# Patient Record
Sex: FEMALE | Race: WHITE | NOT HISPANIC OR LATINO | Employment: UNEMPLOYED | URBAN - METROPOLITAN AREA
[De-identification: names, ages, dates, MRNs, and addresses within clinical notes are randomized per-mention and may not be internally consistent; named-entity substitution may affect disease eponyms.]

---

## 2023-03-03 ENCOUNTER — TELEPHONE (OUTPATIENT)
Dept: OTHER | Facility: CLINIC | Age: 58
End: 2023-03-03
Payer: COMMERCIAL

## 2023-03-03 NOTE — TELEPHONE ENCOUNTER
[facility]    Who is the name of the provider?:  Bridget requested    What is the location you see this provider at/preferred location?: [place]  Person calling / Facility: Adry Henao  Phone number:  [phone]  Nurse call back needed:  NO     Reason for call:   Lipidedema self referral Bridget requested    Pharmacy location:  n/a  Outside Imaging: Not Applicable   Can we leave a detailed message on this number?  YES

## 2023-03-03 NOTE — TELEPHONE ENCOUNTER
Patient can be scheduled for in person consult with Dr. Gill for lipedema    Appt note: Self referral for lipedema.     Lidia JOHN, MEIR    [facility]  Office: [phone]  Fax: [phone]

## 2023-03-09 NOTE — TELEPHONE ENCOUNTER
Future Appointments   Date Time Provider Department Center   3/29/2023 12:00 PM Aimee Gill MD [facility]

## 2023-03-29 ENCOUNTER — OFFICE VISIT (OUTPATIENT)
Dept: OTHER | Facility: CLINIC | Age: 58
End: 2023-03-29
Attending: INTERNAL MEDICINE
Payer: COMMERCIAL

## 2023-03-29 ENCOUNTER — LAB (OUTPATIENT)
Dept: LAB | Facility: CLINIC | Age: 58
End: 2023-03-29
Attending: INTERNAL MEDICINE
Payer: COMMERCIAL

## 2023-03-29 VITALS
WEIGHT: 139.6 LBS | BODY MASS INDEX: 29.3 KG/M2 | HEART RATE: 85 BPM | SYSTOLIC BLOOD PRESSURE: 129 MMHG | DIASTOLIC BLOOD PRESSURE: 85 MMHG | OXYGEN SATURATION: 100 % | HEIGHT: 58 IN

## 2023-03-29 DIAGNOSIS — I83.893 VARICOSE VEINS OF BILATERAL LOWER EXTREMITIES WITH OTHER COMPLICATIONS: Primary | ICD-10-CM

## 2023-03-29 DIAGNOSIS — E53.8 VITAMIN B12 DEFICIENCY (NON ANEMIC): ICD-10-CM

## 2023-03-29 DIAGNOSIS — R60.9 LIPEDEMA: ICD-10-CM

## 2023-03-29 DIAGNOSIS — R25.2 LEG CRAMPS: ICD-10-CM

## 2023-03-29 DIAGNOSIS — D64.9 ANEMIA, UNSPECIFIED TYPE: ICD-10-CM

## 2023-03-29 DIAGNOSIS — Z98.84 S/P GASTRIC BYPASS: ICD-10-CM

## 2023-03-29 LAB
ALBUMIN SERPL BCG-MCNC: 4.3 G/DL (ref 3.5–5.2)
ALP SERPL-CCNC: 148 U/L (ref 35–104)
ALT SERPL W P-5'-P-CCNC: 23 U/L (ref 10–35)
ANION GAP SERPL CALCULATED.3IONS-SCNC: 11 MMOL/L (ref 7–15)
AST SERPL W P-5'-P-CCNC: 26 U/L (ref 10–35)
BASOPHILS # BLD AUTO: 0 10E3/UL (ref 0–0.2)
BASOPHILS NFR BLD AUTO: 0 %
BILIRUB SERPL-MCNC: 0.7 MG/DL
BUN SERPL-MCNC: 15 MG/DL (ref 6–20)
CALCIUM SERPL-MCNC: 9.7 MG/DL (ref 8.6–10)
CHLORIDE SERPL-SCNC: 105 MMOL/L (ref 98–107)
CREAT SERPL-MCNC: 0.81 MG/DL (ref 0.51–0.95)
DEPRECATED HCO3 PLAS-SCNC: 28 MMOL/L (ref 22–29)
EOSINOPHIL # BLD AUTO: 0.1 10E3/UL (ref 0–0.7)
EOSINOPHIL NFR BLD AUTO: 2 %
ERYTHROCYTE [DISTWIDTH] IN BLOOD BY AUTOMATED COUNT: 14.1 % (ref 10–15)
FERRITIN SERPL-MCNC: 17 NG/ML (ref 11–328)
FOLATE SERPL-MCNC: 25.3 NG/ML (ref 4.6–34.8)
GFR SERPL CREATININE-BSD FRML MDRD: 85 ML/MIN/1.73M2
GLUCOSE SERPL-MCNC: 93 MG/DL (ref 70–99)
HCT VFR BLD AUTO: 41.6 % (ref 35–47)
HGB BLD-MCNC: 13.4 G/DL (ref 11.7–15.7)
IMM GRANULOCYTES # BLD: 0 10E3/UL
IMM GRANULOCYTES NFR BLD: 0 %
IRON BINDING CAPACITY (ROCHE): 441 UG/DL (ref 240–430)
IRON SATN MFR SERPL: 22 % (ref 15–46)
IRON SERPL-MCNC: 96 UG/DL (ref 37–145)
LYMPHOCYTES # BLD AUTO: 2 10E3/UL (ref 0.8–5.3)
LYMPHOCYTES NFR BLD AUTO: 37 %
MAGNESIUM SERPL-MCNC: 2.3 MG/DL (ref 1.7–2.3)
MCH RBC QN AUTO: 28.9 PG (ref 26.5–33)
MCHC RBC AUTO-ENTMCNC: 32.2 G/DL (ref 31.5–36.5)
MCV RBC AUTO: 90 FL (ref 78–100)
MONOCYTES # BLD AUTO: 0.3 10E3/UL (ref 0–1.3)
MONOCYTES NFR BLD AUTO: 6 %
NEUTROPHILS # BLD AUTO: 3 10E3/UL (ref 1.6–8.3)
NEUTROPHILS NFR BLD AUTO: 55 %
NRBC # BLD AUTO: 0 10E3/UL
NRBC BLD AUTO-RTO: 0 /100
PLATELET # BLD AUTO: 306 10E3/UL (ref 150–450)
POTASSIUM SERPL-SCNC: 4.8 MMOL/L (ref 3.4–5.3)
PROT SERPL-MCNC: 7.4 G/DL (ref 6.4–8.3)
RBC # BLD AUTO: 4.63 10E6/UL (ref 3.8–5.2)
SODIUM SERPL-SCNC: 144 MMOL/L (ref 136–145)
TSH SERPL DL<=0.005 MIU/L-ACNC: 2.29 UIU/ML (ref 0.3–4.2)
VIT B12 SERPL-MCNC: 620 PG/ML (ref 232–1245)
WBC # BLD AUTO: 5.4 10E3/UL (ref 4–11)

## 2023-03-29 PROCEDURE — 82728 ASSAY OF FERRITIN: CPT

## 2023-03-29 PROCEDURE — 99205 OFFICE O/P NEW HI 60 MIN: CPT | Performed by: INTERNAL MEDICINE

## 2023-03-29 PROCEDURE — 83735 ASSAY OF MAGNESIUM: CPT

## 2023-03-29 PROCEDURE — 83550 IRON BINDING TEST: CPT

## 2023-03-29 PROCEDURE — 85025 COMPLETE CBC W/AUTO DIFF WBC: CPT

## 2023-03-29 PROCEDURE — G0463 HOSPITAL OUTPT CLINIC VISIT: HCPCS

## 2023-03-29 PROCEDURE — 82607 VITAMIN B-12: CPT

## 2023-03-29 PROCEDURE — 84443 ASSAY THYROID STIM HORMONE: CPT

## 2023-03-29 PROCEDURE — 80053 COMPREHEN METABOLIC PANEL: CPT

## 2023-03-29 PROCEDURE — 99212 OFFICE O/P EST SF 10 MIN: CPT | Performed by: INTERNAL MEDICINE

## 2023-03-29 PROCEDURE — 82746 ASSAY OF FOLIC ACID SERUM: CPT

## 2023-03-29 PROCEDURE — 36415 COLL VENOUS BLD VENIPUNCTURE: CPT

## 2023-03-29 NOTE — PROGRESS NOTES
"[facility]        Patient is here for a consult to discuss   Self referral for Lipdemea    Pt is currently taking none.    /85 (BP Location: Left arm)   Pulse 85   Ht 4' 10.25\" (1.48 m)   Wt 139 lb 9.6 oz (63.3 kg)   SpO2 100%   BMI 28.93 kg/m      Refills are needed: No    Has homecare services and agency name:  Sultana Martinez RN      "

## 2023-03-29 NOTE — PATIENT INSTRUCTIONS
Courtesy from [email or web address]  Take vein/lymphatic formula 2 pills a day   See edema therapist   Go for  venous comp studies in [place]   Use compression , elevation , wraps etc   Virtual video visit in 2-3 weeks

## 2023-03-29 NOTE — PROGRESS NOTES
[facility] INITIAL VASCULAR MEDICINE CONSULT  ( New patient visit)       PRIMARY HEALTH CARE PROVIDER:  Dr. Miller ( [facility])     REFERRING HEALTH CARE PROVIDER;   Self Referred    REASON FOR CONSULT: Evaluation and management of possible lipedema with known history of bilateral lower extremity varicose veins and underwent multiple venous ablations outside the system        HPI: Adry Henao is a 57 year old very pleasant female lives in [facility] accompanied by her  today for evaluation and management of possible lipedema.  She was morbidly obese weight more than 200 pounds and she is short statured with height around 4 feet 10 inches underwent gastric bypass surgery in 2013 with successful weight loss.  She also has a bilateral lower extremity varicose veins underwent multiple venous ablations and last intervention was in 2019 before the COVID pandemic.  She has been experiencing bilateral leg heaviness fullness fatigue and tiredness.  She is a housewife.  She self researched and found her body habitus and legs looks lipedema and she joined [place] Facebook group and she was self-referred here for further evaluation and management.  She gives a history of attaining puberty around age 12 and her lower body specifically lower abdomen buttocks and thighs are disproportionately larger than rest of the body and she was such a beer than all her friends of her age group.  Her body habitus further worsened after pregnancy and childbirth.  She has 3 children oldest 1 age 39 and she had a baby when she was 18 years old.  Youngest child is 30 years old.  She attained menopause age 52.  No history of a DVT or PE    She also developed B12 deficiency and electrolyte abnormalities and she took for a while B12 pills and multivitamin.    No recent lab work done within the last year    She is new to this clinic reviewed available records in the [facility]  Everywhere and updated chart        PAST MEDICAL HISTORY  Past Medical History:   Diagnosis Date     Anemia      Lipedema      Obesity      Vitamin B12 deficiency (non anemic)        CURRENT MEDICATIONS  Reviewed and she currently not taking any medications    PAST SURGICAL HISTORY:  Past Surgical History:   Procedure Laterality Date     AS KNEE SCOPE,MED/LAT MENISCUS REPAIR      4+ years ago     CHOLECYSTECTOMY      decades ago     GASTRIC BYPASS  2013       ALLERGIES   No Known Allergies    FAMILY HISTORY  Reviewed non contributory   VASCULAR FAMILY HISTORY  1st order relative with atherosclerotic PAD: No  1st order relative with AAA: No  Family history of Familial Hyperlipidemia No  Family History of Hypercoagulable state:No    VASCULAR RISK FACTORS  1. Diabetes:No   2. Smoking: has never smoked.  3. HTN: normotensive  4.Hyperlipidemia:  Unknown       SOCIAL HISTORY  Social History     Socioeconomic History     Marital status:      Spouse name: Not on file     Number of children: Not on file     Years of education: Not on file     Highest education level: Not on file   Occupational History     Not on file   Tobacco Use     Smoking status: Never     Smokeless tobacco: Never   Substance and Sexual Activity     Alcohol use: Yes     Comment: rarely     Drug use: Never     Sexual activity: Not on file   Other Topics Concern     Not on file   Social History Narrative     Not on file     Social Determinants of Health     Financial Resource Strain: Not on file   Food Insecurity: Not on file   Transportation Needs: Not on file   Physical Activity: Not on file   Stress: Not on file   Social Connections: Not on file   Intimate Partner Violence: Not on file   Housing Stability: Not on file       ROS:   General: No change in weight, sleep or appetite.  Normal energy.  No fever or chills  Eyes: Negative for vision changes or eye problems  ENT: No problems with ears, nose or throat.  No difficulty swallowing.  Resp: No  "coughing, wheezing or shortness of breath  CV: No chest pains or palpitations  GI: No nausea, vomiting,  heartburn, abdominal pain, diarrhea, constipation or change in bowel habits  : No urinary frequency or dysuria, bladder or kidney problems  Musculoskeletal: No significant muscle or joint pains  Neurologic: No headaches, numbness, tingling, weakness, problems with balance or coordination  Psychiatric: No problems with anxiety, depression or mental health  Heme/immune/allergy: No history of bleeding or clotting problems or anemia.  No allergies or immune system problems  Endocrine: No history of thyroid disease, diabetes or other endocrine disorders  Skin: No rashes,worrisome lesions or skin problems  Vascular: History of bilateral lower extremity varicose veins underwent multiple venous ablations  Both legs feels heavy, tired, fatigue  Easy bruising  Lower body disproportionately larger than upper body since puberty  Lumpy bumpy lower abdomen thighs and buttock area      EXAM:  /85 (BP Location: Left arm)   Pulse 85   Ht 4' 10.25\" (1.48 m)   Wt 139 lb 9.6 oz (63.3 kg)   SpO2 100%   BMI 28.93 kg/m    In general, the patient is a pleasant female in no apparent distress.    HEENT: NC/AT.  PERRLA.  EOMI.  Sclerae white, not injected.  Nares clear.  Pharynx without erythema or exudate.  Dentition intact.    Neck: No adenopathy.  No thyromegaly. Carotids +2/2 bilaterally without bruits.  No jugular venous distension.   Heart: RRR. Normal S1, S2 splits physiologically. No murmur, rub, click, or gallop. The PMI is in the 5th ICS in the midclavicular line. There is no heave.    Lungs: CTA.  No ronchi, wheezes, rales.  No dullness to percussion.   Abdomen: Soft, nontender, nondistended. No organomegaly. No AAA.  No bruits.   Extremities: Vascular:  She has a bilateral lower extremity varicose veins CEAP 3 CVI  Classical features of lipedema with overhanging of the fat on the knee area, lower abdomen, buttocks " and thighs are disproportionately larger than upper body  Upper arms are  disproportionately larger than forearms  Leg swelling stops at the ankles  No clinical evidence of lymphedema  Palpable symmetrical peripheral pulses  No foot ulcers or leg ulcers      Labs:    Procedures:           Assessment and Plan:     1. Varicose veins of bilateral lower extremities with other complications, CEAP 3 CVI ( previous multiple ablations bilaterally last intervention 2019)     2. Lipedema    This is a very pleasant 57-year-old female with known history of bilateral lower extremity varicose veins with previous multiple interventions of ablation and classical features of lipedema and ongoing fatigue, tiredness of the legs, easy bruising and having difficulty in handling day-to-day activities.  Lower body disproportionately larger than upper body since puberty.  She has no clinical evidence of lymphedema  Overhanging of the fat on the knees and also disproportionate fat deposition in the buttocks and thighs area  Tender to touch and easy bruising etc.    We discussed about the lipedema and its long-term implication and unfortunately there is no cure for it but multimodality approach can minimize the further progression and improve the quality of life etc.    We discussed fat [email or web address] diagram as mentioned above    We talked about the anti-inflammatory diet, paleo diet and intermittent fasting    Lymphatic flow improvement with compression, MLD, vibration, dry brush etc.  Arrange referral to see the lymphedema therapist    We also talked about the lymphatic yoga water aerobics pool exercises walking, cycling, Pilates etc.    She will benefit with vein formula/lymphatic formula supplementation information sheet given    Will also get additional evaluation as delineated below given history of gastric bypass surgery more than a decade ago with possible B12 deficiency, electrolyte abnormalities etc..    I will arrange bilateral  lower extremity venous competency studies order was placed    She lives [place] hours from [facility], will plan for virtual video visit in 2 to 3 weeks after completion of venous comp studies and also laboratory tests      - US Venous Competency Bilateral; Future  - Lymphedema Therapy Referral; Future    3. S/P gastric bypass 2013     - CBC with platelets differential; Future  - Comprehensive metabolic panel; Future    4. Vitamin B12 deficiency (non anemic)  5. Anemia, unspecified type    She has a history of anemia with B12 deficiency developed after gastric bypass surgery and for a while she was taking B12 supplementation.  Currently not on any medications  Will check CBC with differential iron panel, B12 folate and thyroid function tests  Decide further after the test results    - CBC with platelets differential; Future  - IRON; Future  - IRON AND IRON BINDING CAPACITY; Future  - FERRITIN; Future  - Vitamin B12; Future  - Folate; Future  - TSH with free T4 reflex; Future    6. Leg cramps  This appears multifactorial possibly electrolyte abnormalities, B12 deficiency and also lipedema with obesity etc.  After lab results will treat appropriately and meanwhile make a referral for her to see edema therapist and use compression stockings and follow the diet etc.    - Magnesium; Future    60  minutes spent on the date of the encounter doing chart review, history and exam, documentation, and further activities as noted above.  She is new to this clinic reviewed available extensive records in the epic and updated chart  AVS with written instructions given    This note was dictated by lysing Dragon software    Copy of this note to primary care provider      Aimee Gill MD, FADENNIS, FSVM, FNLA, FACP  Vascular Medicine  Clinical Hypertension specialist  Clinical Lipidologist      .

## 2023-03-30 NOTE — TELEPHONE ENCOUNTER
Lymphedema therapy referral and facesheet faxed to .  Right fax confirmed [phone] pm    Lidia JOHN, MEIR    [facility]  Office: [phone]  Fax: [phone]

## 2023-03-30 NOTE — TELEPHONE ENCOUNTER
[facility]    Who is the name of the provider?   Dr Gill    What is the location you see this provider at/preferred location? [place]    Person calling / Facility: Adry    Phone number:  [phone]    Nurse call back needed:  N    Reason for call:  Pt would like her Lymphedema Therapy Referral faxed to: [facility] in [place]  Fax # [phone]  Attn: Umer Alves      Pharmacy location:  N/A    Outside Imaging: N/A    Can we leave a detailed message on this number?  Y    Additional Info:

## 2023-03-31 NOTE — RESULT ENCOUNTER NOTE
All of your labs looks good except slightly elevated alkaline phosphatase and iron binding capacity but hemoglobin is normal.  Ferritin is low normal and percent saturation is low normal.  B12, magnesium and folate are normal    Continue same plan discussed in the clinic    Repeat liver panel and iron panel in 1 to 2 months at your primary care physician's office

## 2023-04-03 ENCOUNTER — TELEPHONE (OUTPATIENT)
Dept: OTHER | Facility: CLINIC | Age: 58
End: 2023-04-03
Payer: COMMERCIAL

## 2023-04-03 NOTE — TELEPHONE ENCOUNTER
Follow-up to 3/29/23    Patient already had labs drawn 3/29/23.      BLE venous competency (prefers [facility])    Follow up one week later - may be virtual or in-clinic.

## 2023-04-04 NOTE — TELEPHONE ENCOUNTER
Bilateral venous comp is scheduled for 04/21/23.  LM asking patient to call to schedule follow up virtual visit.

## 2023-04-06 NOTE — TELEPHONE ENCOUNTER
Future Appointments   Date Time Provider Department Center   4/21/2023  1:30 PM [id number]   4/27/2023  4:00 PM Aimee Gill MD [facility]

## 2023-04-19 ENCOUNTER — TELEPHONE (OUTPATIENT)
Dept: OTHER | Facility: CLINIC | Age: 58
End: 2023-04-19
Payer: COMMERCIAL

## 2023-04-19 NOTE — TELEPHONE ENCOUNTER
[facility]    Who is the name of the provider?:  Bridget    What is the location you see this provider at/preferred location?: [place]  Person calling / Facility: Adry  Phone number:  [phone]  Nurse call back needed:  unknown     Reason for call:  Fax: [phone]      Pt is wanting the referral faxed to number above. On referral they are request diagnosis and signature of dr. is referral when sent.    Pharmacy location:  n/a  Outside Imaging: n/a  Can we leave a detailed message on this number?  yes

## 2023-04-19 NOTE — TELEPHONE ENCOUNTER
Lymphedema referral printed and given to Dr. Gill for signature.  Signed and dated lymphedema referral with associated diagnosis faxed to .  Right fax confirmed [phone].    Lidia JOHN, RN    [facility]  Office: [phone]  Fax: [phone]

## 2023-04-21 ENCOUNTER — ANCILLARY PROCEDURE (OUTPATIENT)
Dept: ULTRASOUND IMAGING | Facility: CLINIC | Age: 58
End: 2023-04-21
Attending: INTERNAL MEDICINE
Payer: COMMERCIAL

## 2023-04-21 DIAGNOSIS — I83.893 VARICOSE VEINS OF BILATERAL LOWER EXTREMITIES WITH OTHER COMPLICATIONS: ICD-10-CM

## 2023-04-21 PROCEDURE — 93970 EXTREMITY STUDY: CPT | Performed by: SURGERY

## 2023-04-23 ENCOUNTER — HEALTH MAINTENANCE LETTER (OUTPATIENT)
Age: 58
End: 2023-04-23

## 2023-04-27 ENCOUNTER — VIRTUAL VISIT (OUTPATIENT)
Dept: OTHER | Facility: CLINIC | Age: 58
End: 2023-04-27
Attending: INTERNAL MEDICINE
Payer: COMMERCIAL

## 2023-04-27 DIAGNOSIS — Z98.84 S/P GASTRIC BYPASS: ICD-10-CM

## 2023-04-27 DIAGNOSIS — R60.9 LIPEDEMA: Primary | ICD-10-CM

## 2023-04-27 DIAGNOSIS — I83.893 VARICOSE VEINS OF BILATERAL LOWER EXTREMITIES WITH OTHER COMPLICATIONS: ICD-10-CM

## 2023-04-27 PROCEDURE — 99214 OFFICE O/P EST MOD 30 MIN: CPT | Mod: 95 | Performed by: INTERNAL MEDICINE

## 2023-04-27 PROCEDURE — G0463 HOSPITAL OUTPT CLINIC VISIT: HCPCS | Mod: TEL

## 2023-04-27 NOTE — PROGRESS NOTES
Adry is a 57 year old who is being evaluated via a billable telephone visit.      What phone number would you like to be contacted at? [phone]  How would you like to obtain your AVS? Sherry    Distant Location (provider location):  On-site      Objective         Vitals:  No vitals were obtained today due to virtual visit.    OSORIO MARADIAGA      Provider visit note:    Chief complaint:  Follow-up visit  She was initially seen a month ago for evaluation and management of lipedema and also history of bilateral lower extremity varicose veins underwent multiple ablations outside the system  Leg cramps getting better with magnesium supplementation  Review of recent venous comp studies  Started seeing edema therapist    History of present illness:  For full details please see my initial consult note on March 29, 2023     Adry Henao is a 57 year old very pleasant female lives in [facility] initially seen in the clinic a month ago for evaluation and management of possible lipedema.  She was morbidly obese weight more than 200 pounds and she is short statured with height around 4 feet 10 inches underwent gastric bypass surgery in 2013 with successful weight loss.  She also has a bilateral lower extremity varicose veins underwent multiple venous ablations and last intervention was in 2019 before the COVID pandemic.  She has been experiencing bilateral leg heaviness fullness fatigue and tiredness.  She is a housewife.  She self researched and found her body habitus and legs looks lipedema and she joined [place] Facebook group and she was self-referred  for further evaluation and management.  She gives a history of attaining puberty around age 12 and her lower body specifically lower abdomen buttocks and thighs are disproportionately larger than rest of the body and she was such a beer than all her friends of her age group.  Her body habitus further worsened after pregnancy and childbirth.  She has 3  children oldest 1 age 39 and she had a baby when she was 18 years old.  Youngest child is 30 years old.  She attained menopause age 52.  No history of a DVT or PE    She underwent multiple labs recently all of them were unremarkable except elevated alkaline phosphatase and low normal ferritin  B12 normal, folate normal and magnesium normal  She started taking vein/lymphatic formula  Seen edema therapist  Leg cramps better after magnesium supplementation    Underwent venous competency studies results as delineated below, discussed results with the patient        Review of systems: Reviewed all 12 point review of systems as per HPI otherwise unremarkable    Physical exam:( no physical exam done this is virtual visit)    Reviewed recent laboratory tests, imaging studies in the epic and updated chart    Name:  Adry Henao                                                          Patient ID: [id number]  Date: 2023                                                    : 1965  Sex: female                                                                 Examined by: JAVON Galeano RVT  Age:  57 year old                                                         Reading MD: QUEENIE Mueller MD                                                                                      Ordering MD: JAVON Gill MD     INDICATION:       EXAM TYPE  BILATERAL LOWER EXTREMITY VENOUS DUPLEX FOR VENOUS INSUFFICIENCY  TECHNICAL SUMMARY     A duplex ultrasound study using color flow was performed, to evaluate the bilateral lower extremity veins for valvular incompetence with the patient in a steep reversed trendelenberg.      RIGHT:     The deep veins demonstrate phasic flow, compress and respond to augmentations.  There is no reflux or DVT. The femoral vein is small in size with largest AP diameter of 4.8 mm and smallest 3.5 mm.      The GSV is not seen 9 mm below the SFJ to the ankle. The GSV demonstrates phasic flow,  compresses and responds to augmentations at the saphenofemoral junction with no evidence of reflux or thrombus. The great saphenous vein measures 4.0 mm at the saphenofemoral junction and is not seen distally.      The AASV is competent ( 2.0 mm) draining into the saphenofemoral junction. There is an incompetent varicose vein (2.3 mm) off the AASV just after the junction coursing anterolateral with a reflux time of 3217 milliseconds.      The Giacomini vein is too small to assess competency but appears patent ( 0.7 mm) communicating with the small saphenous vein at the knee level.      The SSV compresses from the popliteal space to the ankle.  The SSV is too small to assess competency (0.9 mm). No thrombus is seen. The saphenopopliteal junction is absent.      Perforators: there is no evidence of incompetent  veins at any level.      LEFT:     The deep veins demonstrate phasic flow, compress and respond to augmentations.  There is no DVT.  The common femoral vein is incompetent and free of thrombus. The remaining deep veins are competent and free of thrombus.      The GSV is not seen 12.4 mm below the SFJ to the proximal calf. The visualized segments of the GSV demonstrates phasic flow, compresses and responds to augmentations at the saphenofemoral junction and from the mid calf to ankle with no evidence of  thrombus. The great saphenous vein measures 5.4 mm at the saphenofemoral junction and is not seen at the proximal thigh or knee. The GSV measures 1.4 mm at the mid calf. The GSV is incompetent at the SFJ with a  reflux time of 1683 milliseconds.       The AASV is mildly incompetent ( 2.4 mm) draining into the saphenofemoral junction. The AASV is incompetent at the saphenofemoal junction with a reflux time of 907 milliseconds. The AASV is competent at the proximal thigh. The AASV takes a straight course for 12 cm.      The Giacomini vein is absent.     The SSV  compresses  from the popliteal space to  the ankle. The SSV is too small to assess competency.  No thrombus is seen. The SPJ is patent (0.8 mm).       Perforators: there is no evidence of incompetent  veins at any level.      There is an small incompetent varicose vein cluster in the posteromedial calf (1.5 mm) unable to follow to origin.      FINAL SUMMARY:  1.   No deep or superficial vein thrombosis in either lower extremity  2.  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    Absent right great saphenous vein  4.  2.3 mm diameter incompetent vein branch coursing from right anterior accessory saphenous vein stump to lateral right leg  5.  Absent proximal thigh to proximal calf left great saphenous vein  6.  Left saphenofemoral junction incompetence          Incompetence Criteria: Greater than 500 milliseconds reflux in the superficial and  veins and greater than 1000 milliseconds reflux in the deep veins.     MANJULA Mueller MD, FACS       Assessment and plan:    1. Lipedema stage 2     2. Varicose veins of bilateral lower extremities with other complications, CEAP 3 CVI ( previous multiple ablations bilaterally last intervention 2019)         This is a very pleasant 57-year-old female with known history of bilateral lower extremity varicose veins with previous multiple interventions of ablation and classical features of lipedema and ongoing fatigue, tiredness of the legs, easy bruising and having difficulty in handling day-to-day activities.  Lower body disproportionately larger than upper body since puberty.  She has no clinical evidence of lymphedema  Overhanging of the fat on the knees and also disproportionate fat deposition in the buttocks and thighs area  Tender to touch and easy bruising etc.     We discussed about the lipedema and its long-term implication and unfortunately there is no cure for it but multimodality approach can minimize the further progression and improve the quality of life etc.     We discussed fat  [email or web address] diagram during last office visit and multimodality approach  Referral made to see the edema therapist     We talked about the anti-inflammatory diet, paleo diet and intermittent fasting     Lymphatic flow improvement with compression, MLD, vibration, dry brush etc.       We also talked about the lymphatic yoga water aerobics pool exercises walking, cycling, Pilates etc.     She started taking vein formula/lymphatic formula supplementation        She lives [place] hours from [facility], will plan for virtual video visit in 6 months           3. S/P gastric bypass 2013      4. Vitamin B12 deficiency (non anemic)  5. Anemia, unspecified type     Recent CBC looks normal  B12 and folate was normal  Ferritin was low normal  Suggested establish care with the primary and get periodic anemia lab tests and take IV iron therapy since she cannot handle oral iron pills        6. Leg cramps  This appears multifactorial possibly electrolyte abnormalities,  She is getting better with supplementation of magnesium, vein formula/lymphatic formula and compression and elevation etc.    Phone visit start time: 3:50 PM  Location of the patient: At her home  Location of the provider: [facility]    30 minutes spent on the date of the encounter doing chart review, history , documentation, and further activities as noted above.  AVS with written instructions given     This note was dictated by using Dragon software         This visit is being conducted as a virtual visit due to the emphasis on mitigation of the COVID-19 virus pandemic. The clinician has decided that the risk of an in-office visit outweighs the benefit for this patient.    Aimee Gill MD, HECTOR, FSVM, FNLA, FACP  Vascular Medicine  Clinical Hypertension specialist  Clinical Lipidologist

## 2023-04-27 NOTE — PATIENT INSTRUCTIONS
1.  You have a stage II lipedema, as we discussed on the phone continue to work with edema therapist, compression, MLD, take supplementation  2.  All of your recent labs looks good except low normal ferritin and elevated alkaline phosphatase.  Normal B12, folate, magnesium  3.  Establish care with primary care physician and periodically get anemia labs checkup and if needed get IV iron therapy etc.  4.  Pool exercises, intermittent fasting and anti-inflammatory diet lymphatic yoga etc.  5.  Virtual or office visit in 6 months  Your recent venous competency studies no blood clots in either legs and changes related to previous venous ablation noted.  Continue same plan

## 2023-06-06 ENCOUNTER — TELEPHONE (OUTPATIENT)
Dept: OTHER | Facility: CLINIC | Age: 58
End: 2023-06-06
Payer: COMMERCIAL

## 2023-06-07 NOTE — TELEPHONE ENCOUNTER
Documents signed by Dr. Gill, faxed to [facility] at [phone].    Rightfax confirmation 06/07/23 10:25    Sent to [facility].

## 2023-06-27 ENCOUNTER — TRANSFERRED RECORDS (OUTPATIENT)
Dept: HEALTH INFORMATION MANAGEMENT | Facility: CLINIC | Age: 58
End: 2023-06-27
Payer: COMMERCIAL

## 2023-06-29 ENCOUNTER — MYC MEDICAL ADVICE (OUTPATIENT)
Dept: OTHER | Facility: CLINIC | Age: 58
End: 2023-06-29
Payer: COMMERCIAL

## 2023-06-30 ENCOUNTER — TELEPHONE (OUTPATIENT)
Dept: OTHER | Facility: CLINIC | Age: 58
End: 2023-06-30
Payer: COMMERCIAL

## 2023-06-30 NOTE — TELEPHONE ENCOUNTER
[email or web address] pt requested a letter that I would like to a better understanding of request.   I left a message for patient to call back.

## 2024-03-07 NOTE — TELEPHONE ENCOUNTER
[facility]    Who is the name of the provider?:  Bridget    What is the location you see this provider at/preferred location?: [place]  Person calling / Facility: Sophia / [facility]  Phone number:  [phone]  Nurse call back needed:  YES     Reason for call:  [facility] calling to confirm fax sent on 6/1 for prescription for compression device needing signature. Please review and advise Sophia.    Pharmacy location:  n/a  Outside Imaging: n/a   Can we leave a detailed message on this number?  YES        by Agustina Cleaning DPM on 3/12/2024 at 2:23 PM

## 2024-06-30 ENCOUNTER — HEALTH MAINTENANCE LETTER (OUTPATIENT)
Age: 59
End: 2024-06-30

## 2025-05-11 ENCOUNTER — HEALTH MAINTENANCE LETTER (OUTPATIENT)
Age: 60
End: 2025-05-11

## 2025-07-13 ENCOUNTER — HEALTH MAINTENANCE LETTER (OUTPATIENT)
Age: 60
End: 2025-07-13